# Patient Record
Sex: FEMALE | Race: WHITE | URBAN - METROPOLITAN AREA
[De-identification: names, ages, dates, MRNs, and addresses within clinical notes are randomized per-mention and may not be internally consistent; named-entity substitution may affect disease eponyms.]

---
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
## 2020-01-08 ENCOUNTER — APPOINTMENT (OUTPATIENT)
Dept: OBGYN | Facility: CLINIC | Age: 20
End: 2020-01-08

## 2020-06-14 ENCOUNTER — INPATIENT (INPATIENT)
Facility: HOSPITAL | Age: 20
LOS: 0 days | Discharge: HOME | DRG: 343 | End: 2020-06-15
Attending: SURGERY | Admitting: SURGERY
Payer: COMMERCIAL

## 2020-06-14 ENCOUNTER — TRANSCRIPTION ENCOUNTER (OUTPATIENT)
Age: 20
End: 2020-06-14

## 2020-06-14 VITALS
HEART RATE: 77 BPM | TEMPERATURE: 98 F | SYSTOLIC BLOOD PRESSURE: 101 MMHG | WEIGHT: 119.93 LBS | RESPIRATION RATE: 14 BRPM | OXYGEN SATURATION: 97 % | HEIGHT: 63 IN | DIASTOLIC BLOOD PRESSURE: 68 MMHG

## 2020-06-14 PROCEDURE — 99285 EMERGENCY DEPT VISIT HI MDM: CPT

## 2020-06-14 RX ORDER — ONDANSETRON 8 MG/1
4 TABLET, FILM COATED ORAL ONCE
Refills: 0 | Status: COMPLETED | OUTPATIENT
Start: 2020-06-14 | End: 2020-06-14

## 2020-06-14 RX ORDER — SODIUM CHLORIDE 9 MG/ML
1000 INJECTION INTRAMUSCULAR; INTRAVENOUS; SUBCUTANEOUS ONCE
Refills: 0 | Status: COMPLETED | OUTPATIENT
Start: 2020-06-14 | End: 2020-06-14

## 2020-06-14 RX ADMIN — SODIUM CHLORIDE 1000 MILLILITER(S): 9 INJECTION INTRAMUSCULAR; INTRAVENOUS; SUBCUTANEOUS at 23:51

## 2020-06-14 RX ADMIN — ONDANSETRON 4 MILLIGRAM(S): 8 TABLET, FILM COATED ORAL at 23:52

## 2020-06-14 NOTE — ED ADULT NURSE NOTE - PMH
No pertinent past medical history    Uncomplicated asthma, unspecified asthma severity, unspecified whether persistent

## 2020-06-14 NOTE — ED ADULT NURSE NOTE - NSIMPLEMENTINTERV_GEN_ALL_ED
Implemented All Universal Safety Interventions:  [place] to call system. Call bell, personal items and telephone within reach. Instruct patient to call for assistance. Room bathroom lighting operational. Non-slip footwear when patient is off stretcher. Physically safe environment: no spills, clutter or unnecessary equipment. Stretcher in lowest position, wheels locked, appropriate side rails in place.

## 2020-06-14 NOTE — ED ADULT NURSE NOTE - OBJECTIVE STATEMENT
20 yr old female presents to the ED with c/o abdominal pain, NV x 6 hours. A+Ox  4. from home. Denies fever, CP, sob, back pain, changes in urinary/ bowel patterns. + BS in all quadrants. Abdomen soft, non distended, but generalized tenderness noted on exam. Bladder non distended and non tender to palpation. LMP 3 weeks ago as reported by the patient. PMH of asthma and acne. skin warm dry and intact. Independantly ambulatory at baseline. 2 episodes of vomiting noted since admission to ED. will continue to monitor.

## 2020-06-15 ENCOUNTER — TRANSCRIPTION ENCOUNTER (OUTPATIENT)
Age: 20
End: 2020-06-15

## 2020-06-15 ENCOUNTER — RESULT REVIEW (OUTPATIENT)
Age: 20
End: 2020-06-15

## 2020-06-15 VITALS
RESPIRATION RATE: 12 BRPM | DIASTOLIC BLOOD PRESSURE: 68 MMHG | HEART RATE: 48 BPM | OXYGEN SATURATION: 97 % | SYSTOLIC BLOOD PRESSURE: 108 MMHG

## 2020-06-15 DIAGNOSIS — K35.80 UNSPECIFIED ACUTE APPENDICITIS: ICD-10-CM

## 2020-06-15 LAB
ALBUMIN SERPL ELPH-MCNC: 4.2 G/DL — SIGNIFICANT CHANGE UP (ref 3.3–5)
ALP SERPL-CCNC: 86 U/L — SIGNIFICANT CHANGE UP (ref 40–120)
ALT FLD-CCNC: 21 U/L — SIGNIFICANT CHANGE UP (ref 12–78)
ANION GAP SERPL CALC-SCNC: 9 MMOL/L — SIGNIFICANT CHANGE UP (ref 5–17)
APPEARANCE UR: CLEAR — SIGNIFICANT CHANGE UP
APTT BLD: 28.7 SEC — SIGNIFICANT CHANGE UP (ref 28.5–37)
AST SERPL-CCNC: 21 U/L — SIGNIFICANT CHANGE UP (ref 15–37)
BASOPHILS # BLD AUTO: 0.04 K/UL — SIGNIFICANT CHANGE UP (ref 0–0.2)
BASOPHILS NFR BLD AUTO: 0.2 % — SIGNIFICANT CHANGE UP (ref 0–2)
BILIRUB SERPL-MCNC: 0.6 MG/DL — SIGNIFICANT CHANGE UP (ref 0.2–1.2)
BILIRUB UR-MCNC: NEGATIVE — SIGNIFICANT CHANGE UP
BLD GP AB SCN SERPL QL: SIGNIFICANT CHANGE UP
BUN SERPL-MCNC: 16 MG/DL — SIGNIFICANT CHANGE UP (ref 7–23)
CALCIUM SERPL-MCNC: 9.4 MG/DL — SIGNIFICANT CHANGE UP (ref 8.5–10.1)
CHLORIDE SERPL-SCNC: 103 MMOL/L — SIGNIFICANT CHANGE UP (ref 96–108)
CO2 SERPL-SCNC: 26 MMOL/L — SIGNIFICANT CHANGE UP (ref 22–31)
COLOR SPEC: YELLOW — SIGNIFICANT CHANGE UP
CREAT SERPL-MCNC: 0.8 MG/DL — SIGNIFICANT CHANGE UP (ref 0.5–1.3)
DIFF PNL FLD: NEGATIVE — SIGNIFICANT CHANGE UP
EOSINOPHIL # BLD AUTO: 0.02 K/UL — SIGNIFICANT CHANGE UP (ref 0–0.5)
EOSINOPHIL NFR BLD AUTO: 0.1 % — SIGNIFICANT CHANGE UP (ref 0–6)
EPI CELLS # UR: SIGNIFICANT CHANGE UP
GLUCOSE SERPL-MCNC: 108 MG/DL — HIGH (ref 70–99)
GLUCOSE UR QL: NEGATIVE — SIGNIFICANT CHANGE UP
HCG UR QL: NEGATIVE — SIGNIFICANT CHANGE UP
HCT VFR BLD CALC: 38.7 % — SIGNIFICANT CHANGE UP (ref 34.5–45)
HGB BLD-MCNC: 13 G/DL — SIGNIFICANT CHANGE UP (ref 11.5–15.5)
IMM GRANULOCYTES NFR BLD AUTO: 0.5 % — SIGNIFICANT CHANGE UP (ref 0–1.5)
INR BLD: 1.21 RATIO — HIGH (ref 0.88–1.16)
KETONES UR-MCNC: ABNORMAL
LEUKOCYTE ESTERASE UR-ACNC: NEGATIVE — SIGNIFICANT CHANGE UP
LIDOCAIN IGE QN: 71 U/L — LOW (ref 73–393)
LYMPHOCYTES # BLD AUTO: 0.98 K/UL — LOW (ref 1–3.3)
LYMPHOCYTES # BLD AUTO: 5.3 % — LOW (ref 13–44)
MCHC RBC-ENTMCNC: 30.2 PG — SIGNIFICANT CHANGE UP (ref 27–34)
MCHC RBC-ENTMCNC: 33.6 GM/DL — SIGNIFICANT CHANGE UP (ref 32–36)
MCV RBC AUTO: 89.8 FL — SIGNIFICANT CHANGE UP (ref 80–100)
MONOCYTES # BLD AUTO: 0.91 K/UL — HIGH (ref 0–0.9)
MONOCYTES NFR BLD AUTO: 4.9 % — SIGNIFICANT CHANGE UP (ref 2–14)
NEUTROPHILS # BLD AUTO: 16.53 K/UL — HIGH (ref 1.8–7.4)
NEUTROPHILS NFR BLD AUTO: 89 % — HIGH (ref 43–77)
NITRITE UR-MCNC: NEGATIVE — SIGNIFICANT CHANGE UP
NRBC # BLD: 0 /100 WBCS — SIGNIFICANT CHANGE UP (ref 0–0)
PH UR: 5 — SIGNIFICANT CHANGE UP (ref 5–8)
PLATELET # BLD AUTO: 252 K/UL — SIGNIFICANT CHANGE UP (ref 150–400)
POTASSIUM SERPL-MCNC: 3.9 MMOL/L — SIGNIFICANT CHANGE UP (ref 3.5–5.3)
POTASSIUM SERPL-SCNC: 3.9 MMOL/L — SIGNIFICANT CHANGE UP (ref 3.5–5.3)
PROT SERPL-MCNC: 8 G/DL — SIGNIFICANT CHANGE UP (ref 6–8.3)
PROT UR-MCNC: 15
PROTHROM AB SERPL-ACNC: 13.6 SEC — HIGH (ref 10–12.9)
RBC # BLD: 4.31 M/UL — SIGNIFICANT CHANGE UP (ref 3.8–5.2)
RBC # FLD: 11.9 % — SIGNIFICANT CHANGE UP (ref 10.3–14.5)
RBC CASTS # UR COMP ASSIST: SIGNIFICANT CHANGE UP /HPF (ref 0–4)
SARS-COV-2 RNA SPEC QL NAA+PROBE: SIGNIFICANT CHANGE UP
SODIUM SERPL-SCNC: 138 MMOL/L — SIGNIFICANT CHANGE UP (ref 135–145)
SP GR SPEC: 1.02 — SIGNIFICANT CHANGE UP (ref 1.01–1.02)
UROBILINOGEN FLD QL: NEGATIVE — SIGNIFICANT CHANGE UP
WBC # BLD: 18.58 K/UL — HIGH (ref 3.8–10.5)
WBC # FLD AUTO: 18.58 K/UL — HIGH (ref 3.8–10.5)
WBC UR QL: SIGNIFICANT CHANGE UP

## 2020-06-15 PROCEDURE — 81001 URINALYSIS AUTO W/SCOPE: CPT

## 2020-06-15 PROCEDURE — 74177 CT ABD & PELVIS W/CONTRAST: CPT

## 2020-06-15 PROCEDURE — 85610 PROTHROMBIN TIME: CPT

## 2020-06-15 PROCEDURE — 96361 HYDRATE IV INFUSION ADD-ON: CPT

## 2020-06-15 PROCEDURE — 36415 COLL VENOUS BLD VENIPUNCTURE: CPT

## 2020-06-15 PROCEDURE — 83690 ASSAY OF LIPASE: CPT

## 2020-06-15 PROCEDURE — 99223 1ST HOSP IP/OBS HIGH 75: CPT | Mod: 57

## 2020-06-15 PROCEDURE — 81025 URINE PREGNANCY TEST: CPT

## 2020-06-15 PROCEDURE — 86850 RBC ANTIBODY SCREEN: CPT

## 2020-06-15 PROCEDURE — 74177 CT ABD & PELVIS W/CONTRAST: CPT | Mod: 26

## 2020-06-15 PROCEDURE — 86900 BLOOD TYPING SEROLOGIC ABO: CPT

## 2020-06-15 PROCEDURE — 99285 EMERGENCY DEPT VISIT HI MDM: CPT

## 2020-06-15 PROCEDURE — 96374 THER/PROPH/DIAG INJ IV PUSH: CPT

## 2020-06-15 PROCEDURE — 85730 THROMBOPLASTIN TIME PARTIAL: CPT

## 2020-06-15 PROCEDURE — 44970 LAPAROSCOPY APPENDECTOMY: CPT

## 2020-06-15 PROCEDURE — 86901 BLOOD TYPING SEROLOGIC RH(D): CPT

## 2020-06-15 PROCEDURE — 99053 MED SERV 10PM-8AM 24 HR FAC: CPT

## 2020-06-15 PROCEDURE — 88304 TISSUE EXAM BY PATHOLOGIST: CPT

## 2020-06-15 PROCEDURE — C1889: CPT

## 2020-06-15 PROCEDURE — 88304 TISSUE EXAM BY PATHOLOGIST: CPT | Mod: 26

## 2020-06-15 PROCEDURE — 44970 LAPAROSCOPY APPENDECTOMY: CPT | Mod: AS

## 2020-06-15 PROCEDURE — 85027 COMPLETE CBC AUTOMATED: CPT

## 2020-06-15 PROCEDURE — 80053 COMPREHEN METABOLIC PANEL: CPT

## 2020-06-15 PROCEDURE — 87635 SARS-COV-2 COVID-19 AMP PRB: CPT

## 2020-06-15 RX ORDER — PIPERACILLIN AND TAZOBACTAM 4; .5 G/20ML; G/20ML
3.38 INJECTION, POWDER, LYOPHILIZED, FOR SOLUTION INTRAVENOUS ONCE
Refills: 0 | Status: COMPLETED | OUTPATIENT
Start: 2020-06-15 | End: 2020-06-15

## 2020-06-15 RX ORDER — ONDANSETRON 8 MG/1
4 TABLET, FILM COATED ORAL ONCE
Refills: 0 | Status: DISCONTINUED | OUTPATIENT
Start: 2020-06-15 | End: 2020-06-15

## 2020-06-15 RX ORDER — SODIUM CHLORIDE 9 MG/ML
1000 INJECTION, SOLUTION INTRAVENOUS
Refills: 0 | Status: DISCONTINUED | OUTPATIENT
Start: 2020-06-15 | End: 2020-06-15

## 2020-06-15 RX ORDER — CEFOTETAN DISODIUM 1 G
2 VIAL (EA) INJECTION ONCE
Refills: 0 | Status: COMPLETED | OUTPATIENT
Start: 2020-06-15 | End: 2020-06-15

## 2020-06-15 RX ORDER — OXYCODONE AND ACETAMINOPHEN 5; 325 MG/1; MG/1
1 TABLET ORAL
Qty: 10 | Refills: 0
Start: 2020-06-15

## 2020-06-15 RX ORDER — ONDANSETRON 8 MG/1
4 TABLET, FILM COATED ORAL EVERY 6 HOURS
Refills: 0 | Status: DISCONTINUED | OUTPATIENT
Start: 2020-06-15 | End: 2020-06-15

## 2020-06-15 RX ORDER — HYDROMORPHONE HYDROCHLORIDE 2 MG/ML
0.5 INJECTION INTRAMUSCULAR; INTRAVENOUS; SUBCUTANEOUS
Refills: 0 | Status: DISCONTINUED | OUTPATIENT
Start: 2020-06-15 | End: 2020-06-15

## 2020-06-15 RX ORDER — ACETAMINOPHEN 500 MG
1000 TABLET ORAL ONCE
Refills: 0 | Status: DISCONTINUED | OUTPATIENT
Start: 2020-06-15 | End: 2020-06-15

## 2020-06-15 RX ADMIN — SODIUM CHLORIDE 75 MILLILITER(S): 9 INJECTION, SOLUTION INTRAVENOUS at 09:16

## 2020-06-15 RX ADMIN — SODIUM CHLORIDE 1000 MILLILITER(S): 9 INJECTION INTRAMUSCULAR; INTRAVENOUS; SUBCUTANEOUS at 00:52

## 2020-06-15 RX ADMIN — SODIUM CHLORIDE 100 MILLILITER(S): 9 INJECTION, SOLUTION INTRAVENOUS at 03:22

## 2020-06-15 RX ADMIN — PIPERACILLIN AND TAZOBACTAM 200 GRAM(S): 4; .5 INJECTION, POWDER, LYOPHILIZED, FOR SOLUTION INTRAVENOUS at 03:22

## 2020-06-15 RX ADMIN — SODIUM CHLORIDE 100 MILLILITER(S): 9 INJECTION, SOLUTION INTRAVENOUS at 07:14

## 2020-06-15 NOTE — H&P ADULT - NSHPPHYSICALEXAM_GEN_ALL_CORE
Vital Signs Last 24 Hrs  T(C): 36.5 (14 Jun 2020 23:07), Max: 36.5 (14 Jun 2020 23:07)  T(F): 97.7 (14 Jun 2020 23:07), Max: 97.7 (14 Jun 2020 23:07)  HR: 77 (14 Jun 2020 23:07) (77 - 77)  BP: 101/68 (14 Jun 2020 23:07) (101/68 - 101/68)  BP(mean): --  RR: 14 (14 Jun 2020 23:07) (14 - 14)  SpO2: 97% (14 Jun 2020 23:07) (97% - 97%)

## 2020-06-15 NOTE — H&P ADULT - GASTROINTESTINAL DETAILS
no distention/soft/no guarding/bowel sounds normal/no bruit/no rigidity/no masses palpable/no organomegaly

## 2020-06-15 NOTE — H&P ADULT - HISTORY OF PRESENT ILLNESS
19 YO FEMALE WITH PAST MEDICAL HISTORY OF ASTHMA, ACNE PRESENTED TO ER WITH LOWER ABDOMINAL PAIN, N/V SINCE 5 PM SUNDAY 06/14/2020.  SHE DENIES ANY FEVER, CHILLS, RECENT TRAVEL, DIARRHEA, CHANGE IN BOWEL HABITS. LMP 3 WEEKS AGO.

## 2020-06-15 NOTE — ASU DISCHARGE PLAN (ADULT/PEDIATRIC) - ASU DC SPECIAL INSTRUCTIONSFT
Keep steri-strips clean, dry and intact x 24 hrs. Apply water proof ice pack 20 mins on, 20 mins off to help decrease pain and swelling. After 24 hrs, you may begin showering as usual but Do NOT remove steri-strips. Do not scrub or soak incision site. Pat dry. NO tub baths, NO swimming pools. No hot tubs.  NO heavy lifting over 15lbs.  You may take over the counter pain medication such as tylenol or motrin as directed as needed for pain.  A prescription for percocet has been sent to your pharmacy to take as needed for any pain NOT relieved with over-the-counter medication.  *NOTE: Tylenol in percocet.  Maximum daily dose of tylenol NOT to exceed 4,000mg.  Do NOT take tylenol and percocet at the same time.

## 2020-06-15 NOTE — ASU DISCHARGE PLAN (ADULT/PEDIATRIC) - CARE PROVIDER_API CALL
Eliazar Meneses  SURGERY  [address] [place] [address]  Phone: [phone]  Fax: [phone]  Follow Up Time:

## 2020-06-15 NOTE — DISCHARGE NOTE PROVIDER - CARE PROVIDERS DIRECT ADDRESSES
[email or web address]

## 2020-06-15 NOTE — H&P ADULT - NSHPLABSRESULTS_GEN_ALL_CORE
06-15    138  |  103  |  16  ----------------------------<  108<H>  3.9   |  26  |  0.80    Ca    9.4      15 Reddy 2020 00:12    TPro  8.0  /  Alb  4.2  /  TBili  0.6  /  DBili  x   /  AST  21  /  ALT  21  /  AlkPhos  86  06-15                          13.0   18.58 )-----------( 252      ( 15 Reddy 2020 00:12 )             38.7     Urinalysis Basic - ( 15 Reddy 2020 00:12 )    Color: Yellow / Appearance: Clear / S.025 / pH: x  Gluc: x / Ketone: Large  / Bili: Negative / Urobili: Negative   Blood: x / Protein: 15 / Nitrite: Negative   Leuk Esterase: Negative / RBC: 0-2 /HPF / WBC 0-2   Sq Epi: x / Non Sq Epi: Occasional / Bacteria: x    Pregnancy Profile, Urine (06.15.20 @ 00:12)    Pregnancy Profile, Urine: Negative: Method: Chromatographic Immunoassay     CT Abdomen and Pelvis w/ IV Cont (06.15.20 @ 01:21)            INTERPRETATION:  Abdominal/Pelvic CT    6/15/2020 1:33 AM    Indication: Right lower quadrant pain    Technique: Axial images were obtained following IVcontrast from the lung bases through pubic symphysis.  95 cc of Omnipaque 350 was administered intravenously without complication and 5 cc was discarded.  Reformatted coronal and sagittal images are submitted.    Comparison: None    FINDINGS:    Evaluation of intra-abdominal structures is limited by opacity of intra-abdominal fat contrast.    LUNG BASES:  There are no pleural effusions.  PERITONEUM:  There is no free air or focal collection.  No free fluid.  LIVER: Normal.  SPLEEN: Normal.  GALLBLADDER: Contracted.  BILIARY TREE: Unremarkable.  PANCREAS: Normal.  ADRENAL GLANDS: Normal.  KIDNEYS: Normal.  BOWEL: Evaluation of bowel is limited by lack of intra-abdominal fat, and lack of bowel distention. The stomach is incompletely distended.There is no small bowel obstruction, focal bowel wall thickening or diverticulitis. The appendix is not clearly identified but medial to the cecum, there is a dilated thick-walled hyperemic structure measuring about 9 mm that terminates in the presacral region suspicious for acute appendicitis (coronal images 29-33, axial images 78-75). However, it is not clearly seen to arise cecal base and cannot clearly be seen to be blind ending.    URINARY BLADDER: Decompressed.  PELVIC ORGANS: No pelvic masses. Involuting right corpus luteal cyst.    There is no significant adenopathy.  VASCULATURE: Unremarkable.  RETROPERITONEUM:  There is no mass.  BONES: Unremarkable.  ABDOMINAL WALL: Tiny fat-containing umbilical hernia.    IMPRESSION:    The study is limited by lack of intra-abdominal fat and lack of bowel distention. Medial to the cecum, there is a thick-walled hyperemic fluid-filled tubular structure suspicious for acute appendicitis. No free air, focal collection,  or bowel obstruction. Involuting right ovarian corpus luteum.    JERZY BRIGHT M.D, ATTENDING RADIOLOGIST

## 2020-06-15 NOTE — H&P ADULT - NSICDXPASTMEDICALHX_GEN_ALL_CORE_FT
PAST MEDICAL HISTORY:  No pertinent past medical history     Uncomplicated asthma, unspecified asthma severity, unspecified whether persistent PAST MEDICAL HISTORY:  Acne     No pertinent past medical history     Uncomplicated asthma, unspecified asthma severity, unspecified whether persistent

## 2020-06-15 NOTE — H&P ADULT - NEGATIVE GASTROINTESTINAL SYMPTOMS
no diarrhea/no constipation/no flatulence/no melena/no steatorrhea/no jaundice/no change in bowel habits/no hematochezia/no hiccoughs

## 2020-06-15 NOTE — DISCHARGE NOTE NURSING/CASE MANAGEMENT/SOCIAL WORK - PATIENT PORTAL LINK FT
You can access the FollowMyHealth Patient Portal offered by [facility] by registering at the following website: [email or web address]. By joining [facility]’s FollowMyHealth portal, you will also be able to view your health information using other applications (apps) compatible with our system.

## 2020-06-15 NOTE — H&P ADULT - ATTENDING COMMENTS
I have personally seen, examined, and participated in the care of this patient. I have reviewed all pertinent clinical information, including history, physical exam, plan, and the PA's note and agree except as noted.    [place] young woman, healthy with one day history of worsening abdominal pain localizing to RLQ with nausea and vomiting.  Leukocytosis of 18K, CT findings consistent with acute appendicitis.  Imaging personally reviewed, agree with radiology interpretation.  Will take to OR this morning for Laparoscopic Appendectomy, possible open.  Risk, Benefits, and Alternatives to surgery have been discussed.  This includes but is not limited to bleeding, infection, damage to adjacent structures, need for additional surgery or interventions, adverse effects of anesthesia such as cardio-respiratory complications, prolonged intubation, cardiac arrhythmia, arrest, and or death.  Risks of forgoing surgery have also been discussed including progression of, and/or worsening of current condition which may then require urgent or emergent treatment or surgery.  Informed consent obtained.  NPO   Abx on call to OR.  Likely discharge to home from PACU.

## 2020-06-15 NOTE — ASU DISCHARGE PLAN (ADULT/PEDIATRIC) - CALL YOUR DOCTOR IF YOU HAVE ANY OF THE FOLLOWING:
Fever greater than (need to indicate Fahrenheit or Celsius)/Bleeding that does not stop/Wound/Surgical Site with redness, or foul smelling discharge or pus/Pain not relieved by Medications

## 2020-06-15 NOTE — DISCHARGE NOTE PROVIDER - NSDCMRMEDTOKEN_GEN_ALL_CORE_FT
oxycodone-acetaminophen 5 mg-325 mg oral tablet: 1 tab(s) orally every 4 to 6 hours, As Needed -for severe pain MDD:6

## 2020-06-15 NOTE — DISCHARGE NOTE PROVIDER - CARE PROVIDER_API CALL
Eliazar Meneses  SURGERY  [address] [place] [address]  Phone: [phone]  Fax: [phone]  Follow Up Time: 1 week

## 2020-06-15 NOTE — DISCHARGE NOTE PROVIDER - NSDCFUADDINST_GEN_ALL_CORE_FT
Keep steri-strips clean, dry and intact x 24 hrs. Apply water proof ice pack 20 mins on, 20 mins off to help decrease pain and swelling. You may begin showering as usual but Do NOT remove steri-strips. Do not scrub or soak incision site. Pat dry. NO tub baths, NO swimming pools. No hot tubs.  Do not lift anything over 10 lbs.  Take frequent walks.  You may take over the counter stool softener such as colace as needed for constipation while taking pain medication.  Take over the counter Tylenol or Motrin/Ibuprofen as needed for  mild/moderate pain. DO NOT take tylenol while taking percocet.  DO NOT DRIVE WHILE TAKING NARCOTIC PAIN MEDICATION.

## 2020-06-15 NOTE — DISCHARGE NOTE PROVIDER - HOSPITAL COURSE
21 YO FEMALE WITH PAST MEDICAL HISTORY OF ASTHMA, ACNE PRESENTED TO ER WITH LOWER ABDOMINAL PAIN, N/V SINCE 5 PM SUNDAY 06/14/2020.    SHE DENIES ANY FEVER, CHILLS, RECENT TRAVEL, DIARRHEA, CHANGE IN BOWEL HABITS. LMP 3 WEEKS AGO. Pt was admitted, given IV antibiotics and scheduled for lap appy in the AM.        Hospital course:        Patient underwent successful laparoscopic appendectomy without complications, was sent to PACU then to the floor for discharge.          Disposition: Patient to follow-up with Dr. Meneses as outpatient in 1 week.

## 2020-06-15 NOTE — PRE-OP CHECKLIST - HEART RATE (BEATS/MIN)
64 Erythromycin Pregnancy And Lactation Text: This medication is Pregnancy Category B and is considered safe during pregnancy. It is also excreted in breast milk.

## 2020-06-17 PROBLEM — L70.9 ACNE, UNSPECIFIED: Chronic | Status: ACTIVE | Noted: 2020-06-15

## 2020-06-17 PROBLEM — J45.909 UNSPECIFIED ASTHMA, UNCOMPLICATED: Chronic | Status: ACTIVE | Noted: 2020-06-14

## 2020-06-23 ENCOUNTER — APPOINTMENT (OUTPATIENT)
Dept: SURGERY | Facility: CLINIC | Age: 20
End: 2020-06-23
Payer: COMMERCIAL

## 2020-06-23 VITALS
HEART RATE: 70 BPM | HEIGHT: 63 IN | DIASTOLIC BLOOD PRESSURE: 74 MMHG | SYSTOLIC BLOOD PRESSURE: 112 MMHG | OXYGEN SATURATION: 97 % | WEIGHT: 118 LBS | BODY MASS INDEX: 20.91 KG/M2

## 2020-06-23 DIAGNOSIS — K35.30 ACUTE APPENDICITIS W/ LOCALIZED PERITONITIS, W/O PERFORATION OR GANGRENE: ICD-10-CM

## 2020-06-23 DIAGNOSIS — K35.80 UNSPECIFIED ACUTE APPENDICITIS: ICD-10-CM

## 2020-06-23 DIAGNOSIS — Z78.9 OTHER SPECIFIED HEALTH STATUS: ICD-10-CM

## 2020-06-23 PROCEDURE — 99024 POSTOP FOLLOW-UP VISIT: CPT

## 2020-06-23 RX ORDER — FLUTICASONE PROPIONATE 220 MCG
AEROSOL WITH ADAPTER (GRAM) INHALATION
Refills: 0 | Status: ACTIVE | COMMUNITY

## 2020-06-23 RX ORDER — MINOCYCLINE HYDROCHLORIDE 100 MG/1
100 CAPSULE ORAL
Refills: 0 | Status: ACTIVE | COMMUNITY

## 2020-06-23 NOTE — PHYSICAL EXAM
[Normal Breath Sounds] : Normal breath sounds [Normal Heart Sounds] : normal heart sounds [Normal Rate and Rhythm] : normal rate and rhythm [No Rash or Lesion] : No rash or lesion [Alert] : alert [Oriented to Person] : oriented to person [Oriented to Place] : oriented to place [Oriented to Time] : oriented to time [Calm] : calm [de-identified] : Soft, nontender nondistended, positive bowel sounds in all four quads.  No hernia or masses. No rebound or guarding.\par surgical incisions healing well.  no signs of infection. [de-identified] : Healthy young woman in no distress [de-identified] : JOSE LUIS MAZA EOMI [de-identified] : ambulating without difficulty or assistance.

## 2020-06-23 NOTE — ASSESSMENT
[FreeTextEntry1] : Unremarkable post operative course following Laparoscopic Appendectomy.\par pathology reviewed and all question answered.  no additional surgical intervention indicated.\par Postoperative instructions have been provided including avoidance of heavy lifting and strenuous activities in excess of 20-25 pounds for duration of 4-6 weeks. The patient may shower keeping the incisions clean, dry, covered as needed.\par \par f/u PRN.

## 2020-06-23 NOTE — HISTORY OF PRESENT ILLNESS
[de-identified] : s/p Appendectomy.  Unremarkable post op course.  No complaints.\par Comfortable.  Tolerating diet.

## 2020-07-27 ENCOUNTER — APPOINTMENT (OUTPATIENT)
Dept: OBGYN | Facility: CLINIC | Age: 20
End: 2020-07-27
Payer: COMMERCIAL

## 2020-07-27 VITALS
BODY MASS INDEX: 20.38 KG/M2 | WEIGHT: 115 LBS | SYSTOLIC BLOOD PRESSURE: 120 MMHG | HEIGHT: 63 IN | DIASTOLIC BLOOD PRESSURE: 84 MMHG

## 2020-07-27 DIAGNOSIS — Z82.49 FAMILY HISTORY OF ISCHEMIC HEART DISEASE AND OTHER DISEASES OF THE CIRCULATORY SYSTEM: ICD-10-CM

## 2020-07-27 DIAGNOSIS — Z30.019 ENCOUNTER FOR INITIAL PRESCRIPTION OF CONTRACEPTIVES, UNSPECIFIED: ICD-10-CM

## 2020-07-27 PROCEDURE — 99385 PREV VISIT NEW AGE 18-39: CPT

## 2020-07-27 RX ORDER — LEVONORGESTREL AND ETHINYL ESTRADIOL 0.1-0.02MG
0.1-2 KIT ORAL DAILY
Qty: 1 | Refills: 3 | Status: ACTIVE | COMMUNITY
Start: 2020-07-27 | End: 1900-01-01

## 2020-07-27 NOTE — HISTORY OF PRESENT ILLNESS
[Healthy Diet] : a healthy diet [Good] : being in good health [Weight Concerns] : no concerns with her weight [Reproductive Age] : is of reproductive age [Regular Exercise] : regular exercise [Menstrual Problems] : reports normal menses [Contraception] : uses contraception [Condoms] : uses condoms [Pregnancy History] : denies prior pregnancies [Normal Amount/Duration] : was of a normal amount and duration [Regular Cycle Intervals] : periods have been regular [Prior Menses:  ___ Date] : date of prior menstruation was [unfilled] [Spotting Between  Menses] : no spotting between menses [Sexually Active] : is sexually active

## 2020-07-27 NOTE — PHYSICAL EXAM
[Awake] : awake [Mass] : no breast mass [Alert] : alert [Acute Distress] : no acute distress [Nipple Discharge] : no nipple discharge [Soft] : soft [Axillary LAD] : no axillary lymphadenopathy [Tender] : non tender [Oriented x3] : oriented to person, place, and time [Normal] : uterus [No Bleeding] : there was no active vaginal bleeding [Uterine Adnexae] : were not tender and not enlarged

## 2020-07-29 LAB
C TRACH RRNA SPEC QL NAA+PROBE: NOT DETECTED
N GONORRHOEA RRNA SPEC QL NAA+PROBE: NOT DETECTED
SOURCE AMPLIFICATION: NORMAL

## 2020-10-01 ENCOUNTER — RX RENEWAL (OUTPATIENT)
Age: 20
End: 2020-10-01

## 2020-10-22 ENCOUNTER — RX RENEWAL (OUTPATIENT)
Age: 20
End: 2020-10-22

## 2020-10-29 NOTE — ED PROVIDER NOTE - OBJECTIVE STATEMENT
Anu, I just saw Ms. Delong. We discussed that I do not prescribe chronic armour thyroid use and discussed the reasons why. Please take a look at my note for details. I discussed switching her over to Tirosint, which is usually more well-tolerated in patients who have had allergies to levothyroxine and synthroid in the past. I gave her information and she will give our office a call when she is ready for the switch. Thank you for the consult. 19yo female who presents with abd pain and vomiting tonite. pt states it started around 5pm with diffuse lower abd pain, and vomiting, no fever, chills, no diarrhea, no sick contacts, pt did not take anything for the pain

## 2021-06-09 ENCOUNTER — TRANSCRIPTION ENCOUNTER (OUTPATIENT)
Age: 21
End: 2021-06-09

## 2021-11-22 ENCOUNTER — RX RENEWAL (OUTPATIENT)
Age: 21
End: 2021-11-22

## 2022-02-16 ENCOUNTER — RX RENEWAL (OUTPATIENT)
Age: 22
End: 2022-02-16

## 2022-02-16 RX ORDER — LEVONORGESTREL AND ETHINYL ESTRADIOL 0.1-0.02MG
0.1-2 KIT ORAL DAILY
Qty: 84 | Refills: 0 | Status: ACTIVE | COMMUNITY
Start: 2020-10-01 | End: 1900-01-01

## 2022-08-05 ENCOUNTER — APPOINTMENT (OUTPATIENT)
Dept: OBGYN | Facility: CLINIC | Age: 22
End: 2022-08-05

## 2022-08-10 ENCOUNTER — APPOINTMENT (OUTPATIENT)
Dept: OBGYN | Facility: CLINIC | Age: 22
End: 2022-08-10

## 2022-08-10 VITALS
WEIGHT: 115 LBS | DIASTOLIC BLOOD PRESSURE: 87 MMHG | BODY MASS INDEX: 20.38 KG/M2 | SYSTOLIC BLOOD PRESSURE: 115 MMHG | HEIGHT: 63 IN

## 2022-08-10 PROCEDURE — 99395 PREV VISIT EST AGE 18-39: CPT

## 2022-08-10 NOTE — HISTORY OF PRESENT ILLNESS
[PapSmeardate] : 2020 [Regular Cycle Intervals] : periods have been regular [FreeTextEntry1] : 8/3/22 [No] : Patient does not have concerns regarding sex

## 2022-08-12 LAB
C TRACH RRNA SPEC QL NAA+PROBE: NOT DETECTED
HPV HIGH+LOW RISK DNA PNL CVX: NOT DETECTED
N GONORRHOEA RRNA SPEC QL NAA+PROBE: NOT DETECTED
SOURCE TP AMPLIFICATION: NORMAL

## 2022-08-15 ENCOUNTER — NON-APPOINTMENT (OUTPATIENT)
Age: 22
End: 2022-08-15

## 2022-08-29 LAB — CYTOLOGY CVX/VAG DOC THIN PREP: ABNORMAL

## 2022-09-01 ENCOUNTER — APPOINTMENT (OUTPATIENT)
Dept: OBGYN | Facility: CLINIC | Age: 22
End: 2022-09-01

## 2022-09-01 DIAGNOSIS — R87.612 LOW GRADE SQUAMOUS INTRAEPITHELIAL LESION ON CYTOLOGIC SMEAR OF CERVIX (LGSIL): ICD-10-CM

## 2022-09-01 PROCEDURE — 57454 BX/CURETT OF CERVIX W/SCOPE: CPT

## 2022-09-01 NOTE — PROCEDURE
[Colposcopy] : Colposcopy  [Risks] : risks [Benefits] : benefits [Alternatives] : alternatives [Patient] : patient [Infection] : infection [Bleeding] : bleeding [Allergic Reaction] : allergic reaction [LGSIL] : LGSIL [No Premedication] : no premedication [Colposcopy Adequate] : colposcopy adequate [Pap Performed] : pap not performed [SCI Fully Visualized] : SCI fully visualized [No Abnormalities] : no abnormalities [Lesion] : lesion seen [Biopsy] : biopsy taken [Hemostasis Obtained] : Hemostasis obtained [Tolerated Well] : the patient tolerated the procedure well [de-identified] : 2 [de-identified] : awe @ [de-identified] : 1 ECC\par 2 CX BX @11

## 2022-09-13 ENCOUNTER — NON-APPOINTMENT (OUTPATIENT)
Age: 22
End: 2022-09-13

## 2022-09-13 LAB — CORE LAB BIOPSY: NORMAL

## 2023-03-04 ENCOUNTER — EMERGENCY (EMERGENCY)
Facility: HOSPITAL | Age: 23
LOS: 1 days | Discharge: HOME | End: 2023-03-04
Attending: EMERGENCY MEDICINE | Admitting: EMERGENCY MEDICINE
Payer: COMMERCIAL

## 2023-03-04 VITALS
WEIGHT: 110.01 LBS | DIASTOLIC BLOOD PRESSURE: 84 MMHG | RESPIRATION RATE: 17 BRPM | SYSTOLIC BLOOD PRESSURE: 122 MMHG | TEMPERATURE: 98 F | HEIGHT: 62 IN | HEART RATE: 85 BPM | OXYGEN SATURATION: 100 %

## 2023-03-04 LAB
ALBUMIN SERPL ELPH-MCNC: 4.1 G/DL — SIGNIFICANT CHANGE UP (ref 3.3–5)
ALP SERPL-CCNC: 70 U/L — SIGNIFICANT CHANGE UP (ref 40–120)
ALT FLD-CCNC: 21 U/L — SIGNIFICANT CHANGE UP (ref 12–78)
ANION GAP SERPL CALC-SCNC: 5 MMOL/L — SIGNIFICANT CHANGE UP (ref 5–17)
APAP SERPL-MCNC: <2 UG/ML — SIGNIFICANT CHANGE UP (ref 10–30)
APTT BLD: 30.6 SEC — SIGNIFICANT CHANGE UP (ref 27.5–35.5)
AST SERPL-CCNC: 19 U/L — SIGNIFICANT CHANGE UP (ref 15–37)
BASOPHILS # BLD AUTO: 0.04 K/UL — SIGNIFICANT CHANGE UP (ref 0–0.2)
BASOPHILS NFR BLD AUTO: 0.4 % — SIGNIFICANT CHANGE UP (ref 0–2)
BILIRUB SERPL-MCNC: 0.2 MG/DL — SIGNIFICANT CHANGE UP (ref 0.2–1.2)
BUN SERPL-MCNC: 9 MG/DL — SIGNIFICANT CHANGE UP (ref 7–23)
CALCIUM SERPL-MCNC: 9 MG/DL — SIGNIFICANT CHANGE UP (ref 8.5–10.1)
CHLORIDE SERPL-SCNC: 106 MMOL/L — SIGNIFICANT CHANGE UP (ref 96–108)
CK SERPL-CCNC: 49 U/L — SIGNIFICANT CHANGE UP (ref 26–192)
CO2 SERPL-SCNC: 26 MMOL/L — SIGNIFICANT CHANGE UP (ref 22–31)
CREAT SERPL-MCNC: 0.79 MG/DL — SIGNIFICANT CHANGE UP (ref 0.5–1.3)
D DIMER BLD IA.RAPID-MCNC: <150 NG/ML DDU — SIGNIFICANT CHANGE UP
EGFR: 108 ML/MIN/1.73M2 — SIGNIFICANT CHANGE UP
EOSINOPHIL # BLD AUTO: 0.15 K/UL — SIGNIFICANT CHANGE UP (ref 0–0.5)
EOSINOPHIL NFR BLD AUTO: 1.5 % — SIGNIFICANT CHANGE UP (ref 0–6)
GLUCOSE SERPL-MCNC: 100 MG/DL — HIGH (ref 70–99)
HCG SERPL-ACNC: <1 MIU/ML — SIGNIFICANT CHANGE UP
HCT VFR BLD CALC: 40.5 % — SIGNIFICANT CHANGE UP (ref 34.5–45)
HGB BLD-MCNC: 14 G/DL — SIGNIFICANT CHANGE UP (ref 11.5–15.5)
IMM GRANULOCYTES NFR BLD AUTO: 0.2 % — SIGNIFICANT CHANGE UP (ref 0–0.9)
INR BLD: 1.04 RATIO — SIGNIFICANT CHANGE UP (ref 0.88–1.16)
LIDOCAIN IGE QN: 141 U/L — SIGNIFICANT CHANGE UP (ref 73–393)
LYMPHOCYTES # BLD AUTO: 3.68 K/UL — HIGH (ref 1–3.3)
LYMPHOCYTES # BLD AUTO: 37.7 % — SIGNIFICANT CHANGE UP (ref 13–44)
MAGNESIUM SERPL-MCNC: 2.2 MG/DL — SIGNIFICANT CHANGE UP (ref 1.6–2.6)
MCHC RBC-ENTMCNC: 30.7 PG — SIGNIFICANT CHANGE UP (ref 27–34)
MCHC RBC-ENTMCNC: 34.6 GM/DL — SIGNIFICANT CHANGE UP (ref 32–36)
MCV RBC AUTO: 88.8 FL — SIGNIFICANT CHANGE UP (ref 80–100)
MONOCYTES # BLD AUTO: 0.7 K/UL — SIGNIFICANT CHANGE UP (ref 0–0.9)
MONOCYTES NFR BLD AUTO: 7.2 % — SIGNIFICANT CHANGE UP (ref 2–14)
NEUTROPHILS # BLD AUTO: 5.16 K/UL — SIGNIFICANT CHANGE UP (ref 1.8–7.4)
NEUTROPHILS NFR BLD AUTO: 53 % — SIGNIFICANT CHANGE UP (ref 43–77)
NRBC # BLD: 0 /100 WBCS — SIGNIFICANT CHANGE UP (ref 0–0)
PLATELET # BLD AUTO: 346 K/UL — SIGNIFICANT CHANGE UP (ref 150–400)
POTASSIUM SERPL-MCNC: 3.8 MMOL/L — SIGNIFICANT CHANGE UP (ref 3.5–5.3)
POTASSIUM SERPL-SCNC: 3.8 MMOL/L — SIGNIFICANT CHANGE UP (ref 3.5–5.3)
PROT SERPL-MCNC: 8.3 G/DL — SIGNIFICANT CHANGE UP (ref 6–8.3)
PROTHROM AB SERPL-ACNC: 12.2 SEC — SIGNIFICANT CHANGE UP (ref 10.5–13.4)
RBC # BLD: 4.56 M/UL — SIGNIFICANT CHANGE UP (ref 3.8–5.2)
RBC # FLD: 11.6 % — SIGNIFICANT CHANGE UP (ref 10.3–14.5)
SALICYLATES SERPL-MCNC: <1.7 MG/DL — LOW (ref 2.8–20)
SODIUM SERPL-SCNC: 137 MMOL/L — SIGNIFICANT CHANGE UP (ref 135–145)
TROPONIN I, HIGH SENSITIVITY RESULT: <3 NG/L — SIGNIFICANT CHANGE UP
TSH SERPL-MCNC: 1.83 UIU/ML — SIGNIFICANT CHANGE UP (ref 0.36–3.74)
WBC # BLD: 9.75 K/UL — SIGNIFICANT CHANGE UP (ref 3.8–10.5)
WBC # FLD AUTO: 9.75 K/UL — SIGNIFICANT CHANGE UP (ref 3.8–10.5)

## 2023-03-04 PROCEDURE — 99285 EMERGENCY DEPT VISIT HI MDM: CPT

## 2023-03-04 PROCEDURE — 93010 ELECTROCARDIOGRAM REPORT: CPT

## 2023-03-04 RX ORDER — SODIUM CHLORIDE 9 MG/ML
1000 INJECTION INTRAMUSCULAR; INTRAVENOUS; SUBCUTANEOUS ONCE
Refills: 0 | Status: COMPLETED | OUTPATIENT
Start: 2023-03-04 | End: 2023-03-04

## 2023-03-04 RX ADMIN — SODIUM CHLORIDE 1000 MILLILITER(S): 9 INJECTION INTRAMUSCULAR; INTRAVENOUS; SUBCUTANEOUS at 23:16

## 2023-03-04 NOTE — ED PROVIDER NOTE - DIFFERENTIAL DIAGNOSIS
Differential includes but not limited to dehydration, thyroid issue, cardiac abnormality, neurological cause, possible PE Differential Diagnosis

## 2023-03-04 NOTE — ED PROVIDER NOTE - PATIENT PORTAL LINK FT
You can access the FollowMyHealth Patient Portal offered by [facility] by registering at the following website: [email or web address]. By joining [employer]’s FollowMyHealth portal, you will also be able to view your health information using other applications (apps) compatible with our system.

## 2023-03-04 NOTE — ED PROVIDER NOTE - OBJECTIVE STATEMENT
Patient is a 22-year-old female who presents to the emergency room status post syncopal episode.  Past medical history of asthma.  She endorses that she has been extremely fatigued for months and falls asleep easily.  Today she reports that she had fallen asleep she woke up she went up to talk to her parents.  While standing talking to her parents she began to feel lightheaded with some black spots in her vision and then per mother patient had a witnessed syncopal event.  LOC was approximately 15 seconds.  She reported near syncopal episodes in the past but denies any prior history of syncope.  Denies any fevers chills nausea vomiting chest pain shortness of breath abdominal pain.  She has noted some intermittent palpitations.  She denies any excess caffeine or supplement use.  She does endorse that she has 1 cup of coffee a day.

## 2023-03-04 NOTE — ED ADULT NURSE NOTE - CHIEF COMPLAINT QUOTE
syncope. Parents state she was passed out for 15 seconds. Pt felt lightheaded. Pt has been feeling more fatigued and is drinking more water. Pt denies sob, palpitations, chest pain. blood sugar 108 in triage.

## 2023-03-04 NOTE — ED ADULT NURSE NOTE - NSICDXPASTMEDICALHX_GEN_ALL_CORE_FT
PAST MEDICAL HISTORY:  Acne     No pertinent past medical history     Uncomplicated asthma, unspecified asthma severity, unspecified whether persistent

## 2023-03-04 NOTE — ED ADULT NURSE NOTE - OBJECTIVE STATEMENT
pt a/o x 4 with a calm affect c/o syncopal episode at home witnessed by family.  pt denies head trauma from fall but states she has recently been feeling dizzy/light headed a lot of the time.  patient placed on monitor, orthostatics done, EKG completed, pending CT and initial lab results

## 2023-03-04 NOTE — ED PROVIDER NOTE - CONSIDERATION OF ADMISSION OBSERVATION
Consideration of Admission/Observation offered observation for cardiac elv in am pt would like to be discharged and will follow up outpatient

## 2023-03-04 NOTE — ED ADULT NURSE NOTE - NSICDXPASTSURGICALHX_GEN_ALL_CORE_FT
Pts family at bedside and updated with POC. PAST SURGICAL HISTORY:  No significant past surgical history

## 2023-03-04 NOTE — ED ADULT TRIAGE NOTE - CHIEF COMPLAINT QUOTE
syncope. Parents state she was passed out for 15 seconds. Pt felt lightheaded. Pt has been feeling more fatigued and is drinking more water. Pt denies sob, palpitations, chest pain. syncope. Parents state she was passed out for 15 seconds. Pt felt lightheaded. Pt has been feeling more fatigued and is drinking more water. Pt denies sob, palpitations, chest pain. blood sugar 108 in triage.

## 2023-03-04 NOTE — ED PROVIDER NOTE - CARE PROVIDER_API CALL
Dawit Yang)  Cardiovascular Disease  [address] [place] [address]  Phone: [phone]  Fax: [phone]  Follow Up Time:     Hammad Mitchell  NEUROLOGY  [address] [place] [address]  Phone: [phone]  Fax: [phone]  Follow Up Time:

## 2023-03-04 NOTE — ED PROVIDER NOTE - NSFOLLOWUPINSTRUCTIONS_ED_ALL_ED_FT
Return to the ED for any new or worsening symptoms  Make sure to remain hydrated  Follow up with cardiology call on Monday to schedule follow up  Follow up with neurology within the week   Until further work up it is suggested that you avoid driving   Advance activity as tolerated    Syncope, Adult    Outline of the head showing blood vessels that supply the brain.   Syncope is when you pass out or faint for a short time. It is caused by a sudden decrease in blood flow to the brain. This can happen for many reasons. It can sometimes happen when seeing blood, getting a shot (injection), or having pain or strong emotions. Most causes of fainting are not dangerous, but in some cases it can be a sign of a serious medical problem.    If you faint, get help right away. Call your local emergency services (911 in the [place]      Follow these instructions at home:    Watch for any changes in your symptoms. Take these actions to stay safe and help with your symptoms:    Knowing when you may be about to faint   •Signs that you may be about to faint include:  •Feeling dizzy or light-headed. It may feel like the room is spinning.      •Feeling weak.      •Feeling like you may vomit (nauseous).      •Seeing spots or seeing all white or all black.      •Having cold, clammy skin.      •Feeling warm and sweaty.      •Hearing ringing in the ears.      •If you start to feel like you might faint, sit or lie down right away. If sitting, lower your head down between your legs. If lying down, raise (elevate) your feet above the level of your heart.  •Breathe deeply and steadily. Wait until all of the symptoms are gone.      •Have someone stay with you until you feel better.        Medicines     •Take over-the-counter and prescription medicines only as told by your doctor.      •If you are taking blood pressure or heart medicine, sit up and stand up slowly. Spend a few minutes getting ready to sit and then stand. This can help you feel less dizzy.      Lifestyle     • Do not drive, use machinery, or play sports until your doctor says it is okay.      • Do not drink alcohol.      • Do not smoke or use any products that contain nicotine or tobacco. If you need help quitting, ask your doctor.      •Avoid hot tubs and saunas.      General instructions     •Talk with your doctor about your symptoms. You may need to have testing to help find the cause.      •Drink enough fluid to keep your pee (urine) pale yellow.    •Avoid standing for a long time. If you must stand for a long time, do movements such as:  •Moving your legs.       •Crossing your legs.       •Flexing and stretching your leg muscles.       •Squatting.        •Keep all follow-up visits.        Contact a doctor if:    •You have episodes of near fainting.        Get help right away if:    •You pass out or faint.      •You hit your head or are injured after fainting.    •You have any of these symptoms:  •Fast or uneven heartbeats (palpitations).      •Pain in your chest, belly, or back.      •Shortness of breath.        •You have jerky movements that you cannot control (seizure).      •You have a very bad headache.      •You are confused.      •You have problems with how you see (vision).      •You are very weak.      •You have trouble walking.      •You are bleeding from your mouth or your butt (rectum).      •You have black or tarry poop (stool).      These symptoms may be an emergency. Get help right away. Call your local emergency services (911 in the [place]    • Do not wait to see if the symptoms will go away.       • Do not drive yourself to the hospital.         Summary    •Syncope is when you pass out or faint for a short time. It is caused by a sudden decrease in blood flow to the brain.      •Signs that you may be about to faint include feeling dizzy or light-headed, feeling like you may vomit, seeing all white or all black, or having cold, clammy skin.      •If you start to feel like you might faint, sit or lie down right away. Lower your head if sitting, or raise (elevate) your feet if lying down. Breathe deeply and steadily. Wait until all of the symptoms are gone.      This information is not intended to replace advice given to you by your health care provider. Make sure you discuss any questions you have with your health care provider.

## 2023-03-04 NOTE — ED PROVIDER NOTE - NEUROLOGICAL, MLM
Alert and oriented, no focal deficits, no motor or sensory deficits. NIH stroke scale 0 CN II-XII intact

## 2023-03-05 VITALS
OXYGEN SATURATION: 100 % | RESPIRATION RATE: 16 BRPM | SYSTOLIC BLOOD PRESSURE: 109 MMHG | DIASTOLIC BLOOD PRESSURE: 78 MMHG | TEMPERATURE: 98 F | HEART RATE: 79 BPM

## 2023-03-05 LAB
CK MB BLD-MCNC: <2 % — SIGNIFICANT CHANGE UP (ref 0–3.5)
CK MB CFR SERPL CALC: <1 NG/ML — SIGNIFICANT CHANGE UP (ref 0–3.6)
PCP SPEC-MCNC: SIGNIFICANT CHANGE UP
RAPID RVP RESULT: SIGNIFICANT CHANGE UP
SARS-COV-2 RNA SPEC QL NAA+PROBE: SIGNIFICANT CHANGE UP
T3FREE SERPL-MCNC: 3.95 PG/ML — SIGNIFICANT CHANGE UP (ref 2–4.4)
T4 FREE SERPL-MCNC: 1.1 NG/DL — SIGNIFICANT CHANGE UP (ref 0.9–1.8)

## 2023-03-05 PROCEDURE — 99285 EMERGENCY DEPT VISIT HI MDM: CPT | Mod: 25

## 2023-03-05 PROCEDURE — 82962 GLUCOSE BLOOD TEST: CPT

## 2023-03-05 PROCEDURE — 83735 ASSAY OF MAGNESIUM: CPT

## 2023-03-05 PROCEDURE — 84702 CHORIONIC GONADOTROPIN TEST: CPT

## 2023-03-05 PROCEDURE — 84484 ASSAY OF TROPONIN QUANT: CPT

## 2023-03-05 PROCEDURE — 36415 COLL VENOUS BLD VENIPUNCTURE: CPT

## 2023-03-05 PROCEDURE — 85379 FIBRIN DEGRADATION QUANT: CPT

## 2023-03-05 PROCEDURE — 84443 ASSAY THYROID STIM HORMONE: CPT

## 2023-03-05 PROCEDURE — 80307 DRUG TEST PRSMV CHEM ANLYZR: CPT

## 2023-03-05 PROCEDURE — 93005 ELECTROCARDIOGRAM TRACING: CPT

## 2023-03-05 PROCEDURE — 84481 FREE ASSAY (FT-3): CPT

## 2023-03-05 PROCEDURE — 82553 CREATINE MB FRACTION: CPT

## 2023-03-05 PROCEDURE — 83690 ASSAY OF LIPASE: CPT

## 2023-03-05 PROCEDURE — 85610 PROTHROMBIN TIME: CPT

## 2023-03-05 PROCEDURE — 82550 ASSAY OF CK (CPK): CPT

## 2023-03-05 PROCEDURE — 85025 COMPLETE CBC W/AUTO DIFF WBC: CPT

## 2023-03-05 PROCEDURE — 0225U NFCT DS DNA&RNA 21 SARSCOV2: CPT

## 2023-03-05 PROCEDURE — 99284 EMERGENCY DEPT VISIT MOD MDM: CPT | Mod: 25

## 2023-03-05 PROCEDURE — 85730 THROMBOPLASTIN TIME PARTIAL: CPT

## 2023-03-05 PROCEDURE — 70450 CT HEAD/BRAIN W/O DYE: CPT | Mod: MA

## 2023-03-05 PROCEDURE — 70450 CT HEAD/BRAIN W/O DYE: CPT | Mod: 26,MA

## 2023-03-05 PROCEDURE — 84439 ASSAY OF FREE THYROXINE: CPT

## 2023-03-05 PROCEDURE — 80053 COMPREHEN METABOLIC PANEL: CPT

## 2023-10-17 ENCOUNTER — APPOINTMENT (OUTPATIENT)
Dept: OBGYN | Facility: CLINIC | Age: 23
End: 2023-10-17
Payer: COMMERCIAL

## 2023-10-17 ENCOUNTER — LABORATORY RESULT (OUTPATIENT)
Age: 23
End: 2023-10-17

## 2023-10-17 VITALS
DIASTOLIC BLOOD PRESSURE: 86 MMHG | SYSTOLIC BLOOD PRESSURE: 127 MMHG | HEIGHT: 63 IN | BODY MASS INDEX: 20.2 KG/M2 | WEIGHT: 114 LBS

## 2023-10-17 DIAGNOSIS — Z30.41 ENCOUNTER FOR SURVEILLANCE OF CONTRACEPTIVE PILLS: ICD-10-CM

## 2023-10-17 DIAGNOSIS — Z01.419 ENCOUNTER FOR GYNECOLOGICAL EXAMINATION (GENERAL) (ROUTINE) W/OUT ABNORMAL FINDINGS: ICD-10-CM

## 2023-10-17 PROCEDURE — 99395 PREV VISIT EST AGE 18-39: CPT

## 2023-10-17 RX ORDER — LEVONORGESTREL AND ETHINYL ESTRADIOL 0.1-0.02MG
0.1-2 KIT ORAL
Qty: 3 | Refills: 3 | Status: ACTIVE | COMMUNITY
Start: 2022-08-10 | End: 1900-01-01

## 2023-10-30 LAB — CYTOLOGY CVX/VAG DOC THIN PREP: ABNORMAL

## 2024-01-30 ENCOUNTER — NON-APPOINTMENT (OUTPATIENT)
Age: 24
End: 2024-01-30

## 2024-01-30 ENCOUNTER — APPOINTMENT (OUTPATIENT)
Dept: OTOLARYNGOLOGY | Facility: CLINIC | Age: 24
End: 2024-01-30
Payer: COMMERCIAL

## 2024-01-30 VITALS
DIASTOLIC BLOOD PRESSURE: 75 MMHG | SYSTOLIC BLOOD PRESSURE: 118 MMHG | HEART RATE: 63 BPM | WEIGHT: 115 LBS | BODY MASS INDEX: 20.38 KG/M2 | HEIGHT: 63 IN

## 2024-01-30 DIAGNOSIS — R59.0 LOCALIZED ENLARGED LYMPH NODES: ICD-10-CM

## 2024-01-30 PROCEDURE — 31231 NASAL ENDOSCOPY DX: CPT

## 2024-01-30 PROCEDURE — 99203 OFFICE O/P NEW LOW 30 MIN: CPT | Mod: 25

## 2024-01-30 NOTE — REASON FOR VISIT
[Initial Consultation] : an initial consultation for [FreeTextEntry2] : Lymph node on Rt side on neck

## 2024-01-30 NOTE — PHYSICAL EXAM
[de-identified] : right level 2 a small mobile lymph node/ non tender/ about 8 mm indimention [Normal] : mucosa is normal [Midline] : trachea located in midline position

## 2024-01-30 NOTE — HISTORY OF PRESENT ILLNESS
[de-identified] : noticed right lower mandibule palpable lymph node for a week no pain no cat exposure [employer] student medical hx reviewed

## 2024-02-01 ENCOUNTER — APPOINTMENT (OUTPATIENT)
Dept: ULTRASOUND IMAGING | Facility: CLINIC | Age: 24
End: 2024-02-01
Payer: COMMERCIAL

## 2024-02-01 PROCEDURE — 76536 US EXAM OF HEAD AND NECK: CPT

## 2024-08-16 NOTE — ED ADULT NURSE NOTE - HISTORY OF COVID-19 VACCINATION
Acetaminophen 1,000 MG every 6 hours as needed for pain. Never take more than 4,000 MG of Acetaminophen in a 24 hour period.    Ibuprofen 600 MG every 6 hours as needed for pain. Never take more than 2,400 MG of Ibuprofen in a 24 hour period. Do not take with any other NSAIDs.    Ice knee for 15 minutes 3 to 4 times daily.          Yes

## 2025-02-07 NOTE — ED ADULT TRIAGE NOTE - GLASGOW COMA SCALE: BEST MOTOR RESPONSE, MLM
Physical Therapy    Visit Type: treatment    Relevant History/Co-morbidities: status post IR earlier this afternoon to assess drain    SUBJECTIVE    Patient requesting physical therapy in room only due to fatigue from IR earlier.   Patient / Family Goal: maximize function    Pain     At onset of session (out of 10): 0     OBJECTIVE     Cognitive Status   Level of Consciousness   - alert  Arousal Alertness   - appropriate responses to stimuli  Affect/Behavior    - cooperative, pleasant and calm  Orientation    - Oriented to: person, place, time and situation  Functional Communication   - Overall Communication Status: within functional limits   - Forms of Communication: verbal  Attention Span    - Attention: intact  Following Direction   - follows all commands and directions consistently  Transition Between Tasks   - transitions without difficulty  Memory   - intact  Safety Awareness/Insight   - intact  Awareness of Deficits   - fully aware of deficits      Sitting Balance  (AKHIL = base of support)  Static      - Trial 1 details: independent  Dynamic      - Trial 1 details: independent    Standing Balance  (AKHIL = base of support)  Firm Surface: Double Leg      - Static, Eyes Open       - Trial 1 details: independent and without UE support     - Dynamic, Eyes Open       - Trial 1 details: stand by assist and without UE support    Balance Tests   Tinetti Assessment:   Discipline: Physical  Tinetti Balance  Sitting Balance: Steady, safe  Arises: Able without using arms  Attempts to Arise: Able to rise, 1 attempt  Immediate Standing Balance (first 5 seconds): Steady without walker or other support  Standing Balance: Steady but wide stance (medial heels > 4 inches apart) and uses cane or other support  Nudged: Steady  Eyes Closed (at maximum position nudged): Steady  Turning 360 Degrees (part 1): Discontinuous steps  Turning 360 Degrees (part 2): Steady  Sitting Down: Safe, smooth motion  Balance Score: 14  Tinetti  (M6) obeys commands Gait  Initiation of Gait (immediately after told to \"go\"): No hesitancy  Step Length and Height part 1: Right foot passes left stand foot  Step Length and Height part 2: Right foot completely clears floor  Step Length and Height part 3: Left foot passes right stance foot  Step Length and Height part 4: Left foot completely clears floor  Step Symmetry: Right and left step appear equal  Step Continuity: Steps appear continuous  Path: Straight without walking aid  Trunk: No sway but flexion of knees or back or spreads arms out while walking  Walking Stance: Heels almost touching while walking  Gait Score: 11  Tinetti Balance and Gait Score  Balance and Gait Score: 25 - INDICATES LOW FALL RISK  See flowsheet for complete test.         Transfers  Assistive devices: gait belt, none  - Sit to stand: independent  - Stand to sit: independent  - Stand pivot: independent    Ambulation / Gait  - Assistive device: gait belt and none  - Distance (feet unless otherwise indicated): 15, 15, 25  - Assist Level: supervision  - Description: decreased seth/pace      Short distance ambulation in room only due to fatigue. No loss of balance or unsteadiness noted. She states she initially used rolling walker in hospital due to generalized weakness but she is feeling more confident overall with her mobility.     Stair Ambulation  - Assist Level: not attempted due to not medically appropriate or safe    Interventions     Seated    Lower Extremity: Bilateral: seated hip flexion, knee flexion, toe raises, heel raises, knee extensions and hip abduction, AROM, 10 reps, 1 sets    Upper Extremity: Bilateral: shoulder flexion, elbow extension and elbow flexion (Punches), AROM, 10 reps, 1 sets  Training provided: balance retraining, transfer training and gait training      Static and dynamic activities without rolling walker:  -Completed Tinetti balance assessment  -standing unsupported at sink completing hand washing; stepping in and out of  shower with 4\" step to step over  -managing door when walking in and out of bathroom    No loss of balance noted through out session. Provided intermittent rest breaks due to reported fatigue.    Skilled input: Verbal instruction/cues  Verbal Consent: Writer verbally educated and received verbal consent for hand placement, positioning of patient, and techniques to be performed today from patient for clothing adjustments for techniques as described above and how they are pertinent to the patient's plan of care.         ASSESSMENT   Progress: progressing toward goals  Interfering components: medical status limitations and decreased activity tolerance    Discharge needs based on today's assessment:   - Current level of function: slightly below baseline level of function   - Therapy needs at discharge: does not require ongoing therapy (likely none)   - Activities of daily living (ADLs) requiring support at discharge: ambulation and stairs   - Impairments that require further therapy intervention: activity tolerance    AM-PAC  - Generalized Prior Level of Function: IND/MOD I ([facility] 22-24)       Key: MOD A=moderate assistance, IND/MOD I=independent/modified independent  - Generalized Current Level of Function     - Current Mobility Score: 20       AM-PAC Scoring Key= >21 Modified Independent; 20-21 Supervision; 18-19 Minimal assist; 13-17 Moderate assist; 9-12 Max assist; <9 Total assist    Therapy Diagnosis:  Other Abnormalities of Gait and Mobility    Patient is progressing well. Likely will not need rolling walker at discharge. Unable to complete stair activity today due to fatigue. Earlier in the day she was at IR due to concern with drain leakage.         PLAN (while hospitalized)  Suggestions for next session as indicated:   PT Frequency: 3-5 x per week      PT/OT Mobility Equipment for Discharge: has two rolling walkers, supplemental oxygen, pulse oximeter, stair lift    A minimum of 8 minutes per session x 1 week  in the acute setting.     Agreement to plan and goals: patient agrees with goals and treatment plan        GOALS  Review Date: 2/7/2025  Long Term Goals: (to be met by time of discharge from hospital)  Sit to supine: Patient will complete sit to supine modified independent.  Status: progressing/ongoing  Supine to sit: Patient will complete supine to sit modified independent.  Status: progressing/ongoing  Sit to stand: Patient will complete sit to stand transfer with 2-wheeled walker, modified independent.   Status: met   Stand pivot: Patient will complete stand pivot transfer with 2-wheeled walker, modified independent.   Status: met   Ambulation (even): Patient will ambulate on even surface for 75 feet with 2-wheeled walker, supervision.   Status: partially met   Stairs: Patient will ambulate 5 steps with using one rail, contact guard or touching/steadying.   Status: progressing/ongoing  Documented in the chart in the following areas: Assessment/Plan.      Patient at End of Session:   Location: in chair  Safety measures: alarm system in place/re-engaged and call light within reach  Handoff to: nurse      Therapy procedure time and total treatment time can be found documented on the Time Entry flowsheet